# Patient Record
Sex: MALE
[De-identification: names, ages, dates, MRNs, and addresses within clinical notes are randomized per-mention and may not be internally consistent; named-entity substitution may affect disease eponyms.]

---

## 2018-03-02 PROBLEM — Z00.00 ENCOUNTER FOR PREVENTIVE HEALTH EXAMINATION: Status: ACTIVE | Noted: 2018-03-02

## 2018-03-22 ENCOUNTER — APPOINTMENT (OUTPATIENT)
Dept: RHEUMATOLOGY | Facility: CLINIC | Age: 48
End: 2018-03-22

## 2018-07-04 ENCOUNTER — TRANSCRIPTION ENCOUNTER (OUTPATIENT)
Age: 48
End: 2018-07-04

## 2018-07-13 ENCOUNTER — TRANSCRIPTION ENCOUNTER (OUTPATIENT)
Age: 48
End: 2018-07-13

## 2018-12-15 ENCOUNTER — TRANSCRIPTION ENCOUNTER (OUTPATIENT)
Age: 48
End: 2018-12-15

## 2019-09-21 ENCOUNTER — TRANSCRIPTION ENCOUNTER (OUTPATIENT)
Age: 49
End: 2019-09-21

## 2019-09-29 ENCOUNTER — TRANSCRIPTION ENCOUNTER (OUTPATIENT)
Age: 49
End: 2019-09-29

## 2020-12-08 ENCOUNTER — TRANSCRIPTION ENCOUNTER (OUTPATIENT)
Age: 50
End: 2020-12-08

## 2021-04-20 ENCOUNTER — TRANSCRIPTION ENCOUNTER (OUTPATIENT)
Age: 51
End: 2021-04-20

## 2022-02-04 ENCOUNTER — TRANSCRIPTION ENCOUNTER (OUTPATIENT)
Age: 52
End: 2022-02-04

## 2022-04-12 ENCOUNTER — TRANSCRIPTION ENCOUNTER (OUTPATIENT)
Age: 52
End: 2022-04-12

## 2022-05-11 ENCOUNTER — NON-APPOINTMENT (OUTPATIENT)
Age: 52
End: 2022-05-11

## 2022-10-19 ENCOUNTER — APPOINTMENT (OUTPATIENT)
Dept: MRI IMAGING | Facility: CLINIC | Age: 52
End: 2022-10-19

## 2024-03-28 ENCOUNTER — EMERGENCY (EMERGENCY)
Facility: HOSPITAL | Age: 54
LOS: 1 days | Discharge: HOME | End: 2024-03-28
Attending: EMERGENCY MEDICINE
Payer: COMMERCIAL

## 2024-03-28 VITALS
SYSTOLIC BLOOD PRESSURE: 149 MMHG | RESPIRATION RATE: 15 BRPM | DIASTOLIC BLOOD PRESSURE: 86 MMHG | HEART RATE: 58 BPM | OXYGEN SATURATION: 97 % | TEMPERATURE: 98 F

## 2024-03-28 VITALS
OXYGEN SATURATION: 97 % | SYSTOLIC BLOOD PRESSURE: 148 MMHG | DIASTOLIC BLOOD PRESSURE: 84 MMHG | TEMPERATURE: 98 F | HEIGHT: 70 IN | HEART RATE: 78 BPM | RESPIRATION RATE: 20 BRPM | WEIGHT: 210.1 LBS

## 2024-03-28 PROCEDURE — 99283 EMERGENCY DEPT VISIT LOW MDM: CPT

## 2024-03-28 PROCEDURE — 99284 EMERGENCY DEPT VISIT MOD MDM: CPT

## 2024-03-28 RX ORDER — IBUPROFEN 200 MG
600 TABLET ORAL ONCE
Refills: 0 | Status: COMPLETED | OUTPATIENT
Start: 2024-03-28 | End: 2024-03-28

## 2024-03-28 RX ORDER — ACETAMINOPHEN 500 MG
975 TABLET ORAL ONCE
Refills: 0 | Status: COMPLETED | OUTPATIENT
Start: 2024-03-28 | End: 2024-03-28

## 2024-03-28 RX ORDER — LIDOCAINE 4 G/100G
1 CREAM TOPICAL ONCE
Refills: 0 | Status: COMPLETED | OUTPATIENT
Start: 2024-03-28 | End: 2024-03-28

## 2024-03-28 RX ADMIN — Medication 600 MILLIGRAM(S): at 09:29

## 2024-03-28 RX ADMIN — LIDOCAINE 1 PATCH: 4 CREAM TOPICAL at 09:29

## 2024-03-28 RX ADMIN — Medication 975 MILLIGRAM(S): at 09:29

## 2024-03-28 NOTE — ED ADULT NURSE NOTE - CHPI ED NUR SYMPTOMS NEG
no bruising/no decreased eating/drinking/no difficulty bearing weight/no disorientation/no dizziness/no headache/no laceration/no loss of consciousness/no neck tenderness

## 2024-03-28 NOTE — ED PROVIDER NOTE - NSFOLLOWUPINSTRUCTIONS_ED_ALL_ED_FT
Rest. Stay hydrated.    Apply ice for comfort as needed for first 24 hours, then apply heat.   Take Ibuprofen (i.e. Motrin, Advil) 600mg every 8 hrs for pain as needed. Take with food.   May alternate with Acetaminophen (Tylenol) 650mg every 6 hours for pain as needed.     May keep lidoderm patch on for up to 12 hours.   Over the counter patches: Salonpas    Follow up with your Primary Care Provider and Spine doctor upon discharge.     Return to ER for any worsening pain, new numbness/tingling/weakness, trouble walking, bowel or bladder dysfunction, or any other concerns.

## 2024-03-28 NOTE — ED PROVIDER NOTE - PATIENT PORTAL LINK FT
You can access the FollowMyHealth Patient Portal offered by [facility] by registering at the following website: [email or web address]. By joining [employer]’s FollowMyHealth portal, you will also be able to view your health information using other applications (apps) compatible with our system.
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
## 2025-05-29 ENCOUNTER — NON-APPOINTMENT (OUTPATIENT)
Age: 55
End: 2025-05-29

## 2025-05-29 ENCOUNTER — APPOINTMENT (OUTPATIENT)
Dept: CARDIOLOGY | Facility: CLINIC | Age: 55
End: 2025-05-29
Payer: COMMERCIAL

## 2025-05-29 VITALS
WEIGHT: 220 LBS | OXYGEN SATURATION: 98 % | HEART RATE: 87 BPM | DIASTOLIC BLOOD PRESSURE: 76 MMHG | HEIGHT: 69 IN | BODY MASS INDEX: 32.58 KG/M2 | SYSTOLIC BLOOD PRESSURE: 132 MMHG

## 2025-05-29 DIAGNOSIS — R09.89 OTHER SPECIFIED SYMPTOMS AND SIGNS INVOLVING THE CIRCULATORY AND RESPIRATORY SYSTEMS: ICD-10-CM

## 2025-05-29 DIAGNOSIS — Z82.49 FAMILY HISTORY OF ISCHEMIC HEART DISEASE AND OTHER DISEASES OF THE CIRCULATORY SYSTEM: ICD-10-CM

## 2025-05-29 DIAGNOSIS — I10 ESSENTIAL (PRIMARY) HYPERTENSION: ICD-10-CM

## 2025-05-29 DIAGNOSIS — R07.9 CHEST PAIN, UNSPECIFIED: ICD-10-CM

## 2025-05-29 DIAGNOSIS — I48.0 PAROXYSMAL ATRIAL FIBRILLATION: ICD-10-CM

## 2025-05-29 DIAGNOSIS — Z78.9 OTHER SPECIFIED HEALTH STATUS: ICD-10-CM

## 2025-05-29 DIAGNOSIS — Z86.79 PERSONAL HISTORY OF OTHER DISEASES OF THE CIRCULATORY SYSTEM: ICD-10-CM

## 2025-05-29 DIAGNOSIS — Z85.038 PERSONAL HISTORY OF OTHER MALIGNANT NEOPLASM OF LARGE INTESTINE: ICD-10-CM

## 2025-05-29 DIAGNOSIS — Z87.891 PERSONAL HISTORY OF NICOTINE DEPENDENCE: ICD-10-CM

## 2025-05-29 DIAGNOSIS — E78.5 HYPERLIPIDEMIA, UNSPECIFIED: ICD-10-CM

## 2025-05-29 PROCEDURE — 99214 OFFICE O/P EST MOD 30 MIN: CPT

## 2025-05-29 PROCEDURE — 93000 ELECTROCARDIOGRAM COMPLETE: CPT

## 2025-05-29 RX ORDER — METOPROLOL SUCCINATE 50 MG/1
50 TABLET, EXTENDED RELEASE ORAL DAILY
Qty: 90 | Refills: 3 | Status: ACTIVE | COMMUNITY
Start: 1900-01-01 | End: 1900-01-01

## 2025-05-29 RX ORDER — DOXAZOSIN 4 MG/1
4 TABLET ORAL
Qty: 90 | Refills: 3 | Status: ACTIVE | COMMUNITY
Start: 1900-01-01 | End: 1900-01-01

## 2025-05-29 RX ORDER — ASPIRIN 81 MG
81 TABLET, DELAYED RELEASE (ENTERIC COATED) ORAL
Refills: 0 | Status: ACTIVE | COMMUNITY

## 2025-05-29 RX ORDER — AMLODIPINE BESYLATE 10 MG/1
10 TABLET ORAL DAILY
Qty: 90 | Refills: 3 | Status: ACTIVE | COMMUNITY
Start: 1900-01-01 | End: 1900-01-01

## 2025-05-29 RX ORDER — LISINOPRIL 5 MG/1
5 TABLET ORAL DAILY
Qty: 90 | Refills: 3 | Status: ACTIVE | COMMUNITY
Start: 1900-01-01 | End: 1900-01-01

## 2025-06-02 ENCOUNTER — NON-APPOINTMENT (OUTPATIENT)
Age: 55
End: 2025-06-02

## 2025-06-02 PROBLEM — Z82.49 FAMILY HISTORY OF ATRIAL FIBRILLATION: Status: ACTIVE | Noted: 2025-05-29

## 2025-06-02 PROBLEM — Z86.79 HISTORY OF ATRIAL FIBRILLATION: Status: RESOLVED | Noted: 2025-05-29 | Resolved: 2025-06-02

## 2025-06-02 PROBLEM — Z86.79 HISTORY OF ESSENTIAL HYPERTENSION: Status: RESOLVED | Noted: 2025-05-29 | Resolved: 2025-06-02

## 2025-06-02 PROBLEM — E78.5 DYSLIPIDEMIA: Status: ACTIVE | Noted: 2025-06-02

## 2025-06-02 PROBLEM — Z85.038 HISTORY OF COLON CANCER: Status: RESOLVED | Noted: 2025-05-29 | Resolved: 2025-06-02

## 2025-06-02 PROBLEM — Z87.891 FORMER SMOKER: Status: ACTIVE | Noted: 2025-05-29

## 2025-06-02 PROBLEM — Z78.9 ALCOHOL CONSUMPTION OF ONE TO FOUR DRINKS PER WEEK: Status: ACTIVE | Noted: 2025-05-29

## 2025-06-02 PROBLEM — Z86.79 HISTORY OF HYPERTENSION: Status: RESOLVED | Noted: 2025-05-29 | Resolved: 2025-06-02

## 2025-06-02 PROBLEM — I48.0 PAROXYSMAL ATRIAL FIBRILLATION: Status: ACTIVE | Noted: 2025-06-02

## 2025-06-02 PROBLEM — Z82.49 FAMILY HISTORY OF HYPERTENSION: Status: ACTIVE | Noted: 2025-05-29

## 2025-06-02 PROBLEM — I10 PRIMARY HYPERTENSION: Status: ACTIVE | Noted: 2025-06-02

## 2025-07-07 ENCOUNTER — APPOINTMENT (OUTPATIENT)
Dept: CARDIOLOGY | Facility: CLINIC | Age: 55
End: 2025-07-07
Payer: COMMERCIAL

## 2025-07-07 PROCEDURE — 93306 TTE W/DOPPLER COMPLETE: CPT

## 2025-07-07 PROCEDURE — 93880 EXTRACRANIAL BILAT STUDY: CPT

## 2025-07-22 ENCOUNTER — TRANSCRIPTION ENCOUNTER (OUTPATIENT)
Age: 55
End: 2025-07-22

## 2025-08-08 ENCOUNTER — APPOINTMENT (OUTPATIENT)
Dept: CT IMAGING | Facility: CLINIC | Age: 55
End: 2025-08-08

## 2025-08-25 ENCOUNTER — APPOINTMENT (OUTPATIENT)
Dept: OPHTHALMOLOGY | Facility: CLINIC | Age: 55
End: 2025-08-25
Payer: COMMERCIAL

## 2025-08-25 ENCOUNTER — NON-APPOINTMENT (OUTPATIENT)
Age: 55
End: 2025-08-25

## 2025-08-25 PROCEDURE — 92133 CPTRZD OPH DX IMG PST SGM ON: CPT

## 2025-08-25 PROCEDURE — 92004 COMPRE OPH EXAM NEW PT 1/>: CPT

## 2025-08-25 PROCEDURE — 76514 ECHO EXAM OF EYE THICKNESS: CPT

## 2025-09-05 ENCOUNTER — APPOINTMENT (OUTPATIENT)
Dept: CT IMAGING | Facility: CLINIC | Age: 55
End: 2025-09-05

## 2025-09-05 PROCEDURE — 75574 CT ANGIO HRT W/3D IMAGE: CPT
